# Patient Record
Sex: MALE | Race: OTHER | URBAN - METROPOLITAN AREA
[De-identification: names, ages, dates, MRNs, and addresses within clinical notes are randomized per-mention and may not be internally consistent; named-entity substitution may affect disease eponyms.]

---

## 2022-04-20 ENCOUNTER — NEW PATIENT (OUTPATIENT)
Dept: URBAN - METROPOLITAN AREA CLINIC 9 | Facility: CLINIC | Age: 52
End: 2022-04-20

## 2022-04-20 DIAGNOSIS — H52.4: ICD-10-CM

## 2022-04-20 DIAGNOSIS — H02.88B: ICD-10-CM

## 2022-04-20 DIAGNOSIS — H02.88A: ICD-10-CM

## 2022-04-20 DIAGNOSIS — H04.123: ICD-10-CM

## 2022-04-20 PROCEDURE — 92004 COMPRE OPH EXAM NEW PT 1/>: CPT

## 2022-04-20 PROCEDURE — 92015 DETERMINE REFRACTIVE STATE: CPT

## 2022-04-20 ASSESSMENT — TONOMETRY
OS_IOP_MMHG: 17
OD_IOP_MMHG: 17

## 2022-04-20 ASSESSMENT — VISUAL ACUITY
OD_SC: 20/20-1
OS_SC: 20/20-1

## 2022-04-20 NOTE — PATIENT DISCUSSION
Educated patient on findings, conditions, and affect on vision. Prescribe warm compresses BID/prn OU and artificial tears QD/prn OU. Monitor.

## 2022-04-20 NOTE — PATIENT DISCUSSION
OTC Reading glasses recommended. Patient functionally emmetropic at distance. Updated SRx released to patient in event he desires bifocals/PALs. RTC if discomfort.

## 2022-04-21 ASSESSMENT — KERATOMETRY
OD_K2POWER_DIOPTERS: 44.50
OS_K1POWER_DIOPTERS: 43.75
OS_AXISANGLE2_DEGREES: 87
OS_AXISANGLE_DEGREES: 177
OS_K2POWER_DIOPTERS: 44.00
OD_AXISANGLE2_DEGREES: 92
OD_K1POWER_DIOPTERS: 43.75
OD_AXISANGLE_DEGREES: 2

## 2022-07-01 RX ORDER — DILTIAZEM HYDROCHLORIDE 120 MG/1
TABLET, FILM COATED ORAL
COMMUNITY

## 2022-09-14 PROBLEM — E66.9 OBESITY (BMI 30-39.9): Status: ACTIVE | Noted: 2022-09-14

## 2022-09-14 PROBLEM — I48.0 PAROXYSMAL ATRIAL FIBRILLATION (HCC): Status: ACTIVE | Noted: 2022-09-14

## 2023-09-05 ENCOUNTER — ESTABLISHED PATIENT (OUTPATIENT)
Dept: URBAN - METROPOLITAN AREA CLINIC 9 | Facility: CLINIC | Age: 53
End: 2023-09-05

## 2023-09-05 DIAGNOSIS — H02.88A: ICD-10-CM

## 2023-09-05 DIAGNOSIS — H04.123: ICD-10-CM

## 2023-09-05 DIAGNOSIS — H52.4: ICD-10-CM

## 2023-09-05 DIAGNOSIS — H02.88B: ICD-10-CM

## 2023-09-05 PROCEDURE — 92014 COMPRE OPH EXAM EST PT 1/>: CPT

## 2023-09-05 PROCEDURE — 92015 DETERMINE REFRACTIVE STATE: CPT

## 2023-09-05 ASSESSMENT — KERATOMETRY
OS_AXISANGLE2_DEGREES: 88
OD_K1POWER_DIOPTERS: 44.00
OD_AXISANGLE_DEGREES: 20
OD_AXISANGLE2_DEGREES: 110
OD_K2POWER_DIOPTERS: 44.25
OS_K2POWER_DIOPTERS: 44.00
OS_K1POWER_DIOPTERS: 43.50
OS_AXISANGLE_DEGREES: 178

## 2023-09-05 ASSESSMENT — TONOMETRY
OS_IOP_MMHG: 15
OD_IOP_MMHG: 17

## 2023-09-05 ASSESSMENT — VISUAL ACUITY
OU_SC: 20/20-1
OS_SC: 20/20
OD_SC: 20/20

## 2024-09-09 ENCOUNTER — COMPREHENSIVE EXAM (OUTPATIENT)
Facility: LOCATION | Age: 54
End: 2024-09-09

## 2024-09-09 DIAGNOSIS — H04.123: ICD-10-CM

## 2024-09-09 DIAGNOSIS — H02.88A: ICD-10-CM

## 2024-09-09 DIAGNOSIS — H43.393: ICD-10-CM

## 2024-09-09 DIAGNOSIS — H52.4: ICD-10-CM

## 2024-09-09 DIAGNOSIS — H02.88B: ICD-10-CM

## 2024-09-09 PROCEDURE — 92015 DETERMINE REFRACTIVE STATE: CPT

## 2024-09-09 PROCEDURE — 92014 COMPRE OPH EXAM EST PT 1/>: CPT
